# Patient Record
Sex: FEMALE | Race: WHITE | Employment: UNEMPLOYED | URBAN - METROPOLITAN AREA
[De-identification: names, ages, dates, MRNs, and addresses within clinical notes are randomized per-mention and may not be internally consistent; named-entity substitution may affect disease eponyms.]

---

## 2018-01-01 ENCOUNTER — APPOINTMENT (OUTPATIENT)
Dept: GENERAL RADIOLOGY | Facility: HOSPITAL | Age: 83
DRG: 177 | End: 2018-01-01
Attending: FAMILY MEDICINE
Payer: MEDICARE

## 2018-01-01 ENCOUNTER — APPOINTMENT (OUTPATIENT)
Dept: GENERAL RADIOLOGY | Facility: HOSPITAL | Age: 83
DRG: 177 | End: 2018-01-01
Attending: INTERNAL MEDICINE
Payer: MEDICARE

## 2018-01-01 ENCOUNTER — HOSPITAL ENCOUNTER (INPATIENT)
Facility: HOSPITAL | Age: 83
LOS: 6 days | Discharge: NURSING, REHAB OR LONG-TERM CARE | DRG: 177 | End: 2018-01-01
Attending: EMERGENCY MEDICINE | Admitting: FAMILY MEDICINE
Payer: MEDICARE

## 2018-01-01 ENCOUNTER — APPOINTMENT (OUTPATIENT)
Dept: CV DIAGNOSTICS | Facility: HOSPITAL | Age: 83
DRG: 177 | End: 2018-01-01
Attending: INTERNAL MEDICINE
Payer: MEDICARE

## 2018-01-01 ENCOUNTER — HOSPITAL ENCOUNTER (EMERGENCY)
Facility: HOSPITAL | Age: 83
Discharge: HOME | End: 2018-01-01
Attending: EMERGENCY MEDICINE
Payer: MEDICARE

## 2018-01-01 ENCOUNTER — APPOINTMENT (OUTPATIENT)
Dept: GENERAL RADIOLOGY | Facility: HOSPITAL | Age: 83
DRG: 177 | End: 2018-01-01
Attending: EMERGENCY MEDICINE
Payer: MEDICARE

## 2018-01-01 VITALS
RESPIRATION RATE: 20 BRPM | WEIGHT: 100 LBS | BODY MASS INDEX: 17.07 KG/M2 | DIASTOLIC BLOOD PRESSURE: 70 MMHG | HEART RATE: 104 BPM | HEIGHT: 64 IN | OXYGEN SATURATION: 93 % | TEMPERATURE: 98 F | SYSTOLIC BLOOD PRESSURE: 115 MMHG

## 2018-01-01 VITALS
OXYGEN SATURATION: 94 % | SYSTOLIC BLOOD PRESSURE: 107 MMHG | TEMPERATURE: 97 F | WEIGHT: 100.63 LBS | HEART RATE: 73 BPM | HEIGHT: 58 IN | RESPIRATION RATE: 17 BRPM | DIASTOLIC BLOOD PRESSURE: 55 MMHG | BODY MASS INDEX: 21.12 KG/M2

## 2018-01-01 DIAGNOSIS — I50.9 CONGESTIVE HEART FAILURE, UNSPECIFIED HF CHRONICITY, UNSPECIFIED HEART FAILURE TYPE (HCC): Primary | ICD-10-CM
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
## 2018-01-01 DIAGNOSIS — J69.0 ASPIRATION PNEUMONIA (HCC): ICD-10-CM

## 2018-01-01 DIAGNOSIS — J18.9 NOSOCOMIAL PNEUMONIA: ICD-10-CM

## 2018-01-01 DIAGNOSIS — E86.0 DEHYDRATION: ICD-10-CM

## 2018-01-01 DIAGNOSIS — D64.9 ANEMIA, UNSPECIFIED TYPE: Primary | ICD-10-CM

## 2018-01-01 PROCEDURE — 97530 THERAPEUTIC ACTIVITIES: CPT

## 2018-01-01 PROCEDURE — A4216 STERILE WATER/SALINE, 10 ML: HCPCS

## 2018-01-01 PROCEDURE — 80048 BASIC METABOLIC PNL TOTAL CA: CPT

## 2018-01-01 PROCEDURE — 0B938ZZ DRAINAGE OF RIGHT MAIN BRONCHUS, VIA NATURAL OR ARTIFICIAL OPENING ENDOSCOPIC: ICD-10-PCS | Performed by: INTERNAL MEDICINE

## 2018-01-01 PROCEDURE — 92610 EVALUATE SWALLOWING FUNCTION: CPT

## 2018-01-01 PROCEDURE — 97162 PT EVAL MOD COMPLEX 30 MIN: CPT

## 2018-01-01 PROCEDURE — 85025 COMPLETE CBC W/AUTO DIFF WBC: CPT | Performed by: FAMILY MEDICINE

## 2018-01-01 PROCEDURE — 71045 X-RAY EXAM CHEST 1 VIEW: CPT | Performed by: INTERNAL MEDICINE

## 2018-01-01 PROCEDURE — 96361 HYDRATE IV INFUSION ADD-ON: CPT

## 2018-01-01 PROCEDURE — 87070 CULTURE OTHR SPECIMN AEROBIC: CPT | Performed by: INTERNAL MEDICINE

## 2018-01-01 PROCEDURE — 82607 VITAMIN B-12: CPT | Performed by: FAMILY MEDICINE

## 2018-01-01 PROCEDURE — 85730 THROMBOPLASTIN TIME PARTIAL: CPT | Performed by: INTERNAL MEDICINE

## 2018-01-01 PROCEDURE — 96365 THER/PROPH/DIAG IV INF INIT: CPT

## 2018-01-01 PROCEDURE — 87206 SMEAR FLUORESCENT/ACID STAI: CPT | Performed by: INTERNAL MEDICINE

## 2018-01-01 PROCEDURE — 83880 ASSAY OF NATRIURETIC PEPTIDE: CPT | Performed by: EMERGENCY MEDICINE

## 2018-01-01 PROCEDURE — 85025 COMPLETE CBC W/AUTO DIFF WBC: CPT | Performed by: EMERGENCY MEDICINE

## 2018-01-01 PROCEDURE — 82306 VITAMIN D 25 HYDROXY: CPT | Performed by: FAMILY MEDICINE

## 2018-01-01 PROCEDURE — 99152 MOD SED SAME PHYS/QHP 5/>YRS: CPT | Performed by: INTERNAL MEDICINE

## 2018-01-01 PROCEDURE — 80048 BASIC METABOLIC PNL TOTAL CA: CPT | Performed by: EMERGENCY MEDICINE

## 2018-01-01 PROCEDURE — 85027 COMPLETE CBC AUTOMATED: CPT | Performed by: EMERGENCY MEDICINE

## 2018-01-01 PROCEDURE — 85007 BL SMEAR W/DIFF WBC COUNT: CPT | Performed by: EMERGENCY MEDICINE

## 2018-01-01 PROCEDURE — 0B978ZZ DRAINAGE OF LEFT MAIN BRONCHUS, VIA NATURAL OR ARTIFICIAL OPENING ENDOSCOPIC: ICD-10-PCS | Performed by: INTERNAL MEDICINE

## 2018-01-01 PROCEDURE — 92526 ORAL FUNCTION THERAPY: CPT

## 2018-01-01 PROCEDURE — 85025 COMPLETE CBC W/AUTO DIFF WBC: CPT

## 2018-01-01 PROCEDURE — 87106 FUNGI IDENTIFICATION YEAST: CPT | Performed by: INTERNAL MEDICINE

## 2018-01-01 PROCEDURE — 97166 OT EVAL MOD COMPLEX 45 MIN: CPT

## 2018-01-01 PROCEDURE — 87102 FUNGUS ISOLATION CULTURE: CPT | Performed by: INTERNAL MEDICINE

## 2018-01-01 PROCEDURE — 99285 EMERGENCY DEPT VISIT HI MDM: CPT

## 2018-01-01 PROCEDURE — 92611 MOTION FLUOROSCOPY/SWALLOW: CPT

## 2018-01-01 PROCEDURE — 87086 URINE CULTURE/COLONY COUNT: CPT | Performed by: EMERGENCY MEDICINE

## 2018-01-01 PROCEDURE — 80048 BASIC METABOLIC PNL TOTAL CA: CPT | Performed by: FAMILY MEDICINE

## 2018-01-01 PROCEDURE — 87186 SC STD MICRODIL/AGAR DIL: CPT | Performed by: EMERGENCY MEDICINE

## 2018-01-01 PROCEDURE — 71045 X-RAY EXAM CHEST 1 VIEW: CPT | Performed by: FAMILY MEDICINE

## 2018-01-01 PROCEDURE — 71046 X-RAY EXAM CHEST 2 VIEWS: CPT | Performed by: EMERGENCY MEDICINE

## 2018-01-01 PROCEDURE — 93306 TTE W/DOPPLER COMPLETE: CPT | Performed by: INTERNAL MEDICINE

## 2018-01-01 PROCEDURE — 83036 HEMOGLOBIN GLYCOSYLATED A1C: CPT | Performed by: FAMILY MEDICINE

## 2018-01-01 PROCEDURE — 84443 ASSAY THYROID STIM HORMONE: CPT | Performed by: FAMILY MEDICINE

## 2018-01-01 PROCEDURE — 80053 COMPREHEN METABOLIC PANEL: CPT | Performed by: FAMILY MEDICINE

## 2018-01-01 PROCEDURE — 93005 ELECTROCARDIOGRAM TRACING: CPT

## 2018-01-01 PROCEDURE — 88112 CYTOPATH CELL ENHANCE TECH: CPT | Performed by: INTERNAL MEDICINE

## 2018-01-01 PROCEDURE — 87205 SMEAR GRAM STAIN: CPT | Performed by: INTERNAL MEDICINE

## 2018-01-01 PROCEDURE — 74230 X-RAY XM SWLNG FUNCJ C+: CPT | Performed by: FAMILY MEDICINE

## 2018-01-01 PROCEDURE — 93010 ELECTROCARDIOGRAM REPORT: CPT | Performed by: EMERGENCY MEDICINE

## 2018-01-01 PROCEDURE — 80061 LIPID PANEL: CPT | Performed by: FAMILY MEDICINE

## 2018-01-01 PROCEDURE — 96375 TX/PRO/DX INJ NEW DRUG ADDON: CPT

## 2018-01-01 PROCEDURE — 87088 URINE BACTERIA CULTURE: CPT | Performed by: EMERGENCY MEDICINE

## 2018-01-01 PROCEDURE — 87086 URINE CULTURE/COLONY COUNT: CPT | Performed by: FAMILY MEDICINE

## 2018-01-01 PROCEDURE — 87116 MYCOBACTERIA CULTURE: CPT | Performed by: INTERNAL MEDICINE

## 2018-01-01 PROCEDURE — 87077 CULTURE AEROBIC IDENTIFY: CPT | Performed by: INTERNAL MEDICINE

## 2018-01-01 PROCEDURE — 84132 ASSAY OF SERUM POTASSIUM: CPT | Performed by: FAMILY MEDICINE

## 2018-01-01 PROCEDURE — 85610 PROTHROMBIN TIME: CPT | Performed by: INTERNAL MEDICINE

## 2018-01-01 RX ORDER — FUROSEMIDE 20 MG/1
20 TABLET ORAL DAILY
Qty: 30 TABLET | Refills: 0 | Status: ON HOLD | OUTPATIENT
Start: 2018-01-01 | End: 2019-01-01

## 2018-01-01 RX ORDER — FLUTICASONE PROPIONATE 50 MCG
1 SPRAY, SUSPENSION (ML) NASAL DAILY
Status: DISCONTINUED | OUTPATIENT
Start: 2018-01-01 | End: 2018-01-01

## 2018-01-01 RX ORDER — HYDROCHLOROTHIAZIDE 25 MG/1
12.5 TABLET ORAL DAILY
Status: ON HOLD | COMMUNITY
End: 2019-01-01

## 2018-01-01 RX ORDER — ASPIRIN 81 MG
TABLET, DELAYED RELEASE (ENTERIC COATED) ORAL
COMMUNITY

## 2018-01-01 RX ORDER — 0.9 % SODIUM CHLORIDE 0.9 %
VIAL (ML) INJECTION
Status: DISPENSED
Start: 2018-01-01 | End: 2018-01-01

## 2018-01-01 RX ORDER — POTASSIUM CHLORIDE 1.5 G/1.77G
20 POWDER, FOR SOLUTION ORAL DAILY
Status: DISCONTINUED | OUTPATIENT
Start: 2018-01-01 | End: 2018-01-01

## 2018-01-01 RX ORDER — FUROSEMIDE 40 MG/1
40 TABLET ORAL
Status: DISCONTINUED | OUTPATIENT
Start: 2018-01-01 | End: 2018-01-01

## 2018-01-01 RX ORDER — MEGESTROL ACETATE 40 MG/ML
SUSPENSION ORAL DAILY
COMMUNITY

## 2018-01-01 RX ORDER — METHYLPREDNISOLONE SODIUM SUCCINATE 40 MG/ML
40 INJECTION, POWDER, LYOPHILIZED, FOR SOLUTION INTRAMUSCULAR; INTRAVENOUS DAILY
Status: DISCONTINUED | OUTPATIENT
Start: 2018-01-01 | End: 2018-01-01

## 2018-01-01 RX ORDER — ZINC SULFATE 50(220)MG
220 CAPSULE ORAL DAILY
Status: DISCONTINUED | OUTPATIENT
Start: 2018-01-01 | End: 2018-01-01

## 2018-01-01 RX ORDER — 0.9 % SODIUM CHLORIDE 0.9 %
VIAL (ML) INJECTION
Status: COMPLETED
Start: 2018-01-01 | End: 2018-01-01

## 2018-01-01 RX ORDER — SODIUM CHLORIDE 9 MG/ML
INJECTION, SOLUTION INTRAVENOUS
Status: COMPLETED
Start: 2018-01-01 | End: 2018-01-01

## 2018-01-01 RX ORDER — SODIUM CHLORIDE 9 MG/ML
INJECTION, SOLUTION INTRAVENOUS ONCE
Status: COMPLETED | OUTPATIENT
Start: 2018-01-01 | End: 2018-01-01

## 2018-01-01 RX ORDER — MELATONIN
325
Status: DISCONTINUED | OUTPATIENT
Start: 2018-01-01 | End: 2018-01-01

## 2018-01-01 RX ORDER — NYSTATIN 100000 U/G
OINTMENT TOPICAL 2 TIMES DAILY
Status: DISCONTINUED | OUTPATIENT
Start: 2018-01-01 | End: 2018-01-01

## 2018-01-01 RX ORDER — ALBUTEROL SULFATE 2.5 MG/3ML
2.5 SOLUTION RESPIRATORY (INHALATION) EVERY 4 HOURS PRN
COMMUNITY

## 2018-01-01 RX ORDER — FLUTICASONE PROPIONATE AND SALMETEROL 250; 50 UG/1; UG/1
1 POWDER RESPIRATORY (INHALATION) EVERY 12 HOURS SCHEDULED
COMMUNITY

## 2018-01-01 RX ORDER — LOVASTATIN 20 MG/1
20 TABLET ORAL NIGHTLY
COMMUNITY

## 2018-01-01 RX ORDER — PRAVASTATIN SODIUM 20 MG
20 TABLET ORAL NIGHTLY
Status: DISCONTINUED | OUTPATIENT
Start: 2018-01-01 | End: 2018-01-01

## 2018-01-01 RX ORDER — MEGESTROL ACETATE 40 MG/ML
800 SUSPENSION ORAL DAILY
Status: DISCONTINUED | OUTPATIENT
Start: 2018-01-01 | End: 2018-01-01

## 2018-01-01 RX ORDER — PANTOPRAZOLE SODIUM 40 MG/1
40 TABLET, DELAYED RELEASE ORAL
Status: DISCONTINUED | OUTPATIENT
Start: 2018-01-01 | End: 2018-01-01

## 2018-01-01 RX ORDER — HYDROCHLOROTHIAZIDE 25 MG/1
25 TABLET ORAL DAILY
Status: DISCONTINUED | OUTPATIENT
Start: 2018-01-01 | End: 2018-01-01

## 2018-01-01 RX ORDER — MEGESTROL ACETATE 40 MG/ML
40 SUSPENSION ORAL DAILY
Status: DISCONTINUED | OUTPATIENT
Start: 2018-01-01 | End: 2018-01-01

## 2018-01-01 RX ORDER — FUROSEMIDE 10 MG/ML
40 INJECTION INTRAMUSCULAR; INTRAVENOUS ONCE
Status: COMPLETED | OUTPATIENT
Start: 2018-01-01 | End: 2018-01-01

## 2018-01-01 RX ORDER — FUROSEMIDE 20 MG/1
20 TABLET ORAL DAILY
Status: DISCONTINUED | OUTPATIENT
Start: 2018-01-01 | End: 2018-01-01

## 2018-01-01 RX ORDER — AMOXICILLIN AND CLAVULANATE POTASSIUM 500; 125 MG/1; MG/1
1 TABLET, FILM COATED ORAL 2 TIMES DAILY WITH MEALS
Qty: 6 TABLET | Refills: 0 | Status: SHIPPED | OUTPATIENT
Start: 2018-01-01 | End: 2018-01-01

## 2018-01-01 RX ORDER — POTASSIUM CHLORIDE 20 MEQ/1
20 TABLET, EXTENDED RELEASE ORAL DAILY
Status: DISCONTINUED | OUTPATIENT
Start: 2018-01-01 | End: 2018-01-01

## 2018-01-01 RX ORDER — DONEPEZIL HYDROCHLORIDE 10 MG/1
10 TABLET, FILM COATED ORAL NIGHTLY
Status: DISCONTINUED | OUTPATIENT
Start: 2018-01-01 | End: 2018-01-01

## 2018-01-01 RX ORDER — FELODIPINE 5 MG/1
5 TABLET, EXTENDED RELEASE ORAL 2 TIMES DAILY
Status: ON HOLD | COMMUNITY
End: 2018-01-01

## 2018-01-01 RX ORDER — MULTIPLE VITAMINS W/ MINERALS TAB 9MG-400MCG
1 TAB ORAL DAILY
Status: DISCONTINUED | OUTPATIENT
Start: 2018-01-01 | End: 2018-01-01

## 2018-01-01 RX ORDER — AMLODIPINE BESYLATE 5 MG/1
5 TABLET ORAL DAILY
Status: DISCONTINUED | OUTPATIENT
Start: 2018-01-01 | End: 2018-01-01

## 2018-01-01 RX ORDER — CEPHALEXIN 250 MG/1
250 CAPSULE ORAL 2 TIMES DAILY
Qty: 14 CAPSULE | Refills: 0 | Status: SHIPPED | OUTPATIENT
Start: 2018-01-01 | End: 2018-01-01

## 2018-01-01 RX ORDER — POTASSIUM CHLORIDE 20 MEQ/1
40 TABLET, EXTENDED RELEASE ORAL EVERY 4 HOURS
Status: COMPLETED | OUTPATIENT
Start: 2018-01-01 | End: 2018-01-01

## 2018-01-01 RX ORDER — OMEPRAZOLE 40 MG/1
40 CAPSULE, DELAYED RELEASE ORAL DAILY
COMMUNITY

## 2018-01-01 RX ORDER — FLUTICASONE PROPIONATE 50 MCG
SPRAY, SUSPENSION (ML) NASAL DAILY
COMMUNITY

## 2018-01-01 RX ORDER — FUROSEMIDE 40 MG/1
40 TABLET ORAL DAILY
Status: DISCONTINUED | OUTPATIENT
Start: 2018-01-01 | End: 2018-01-01

## 2018-01-01 RX ORDER — MELATONIN
325
COMMUNITY

## 2018-01-01 RX ORDER — DONEPEZIL HYDROCHLORIDE 10 MG/1
10 TABLET, FILM COATED ORAL NIGHTLY
COMMUNITY

## 2018-01-01 RX ORDER — ALBUTEROL SULFATE 2.5 MG/3ML
2.5 SOLUTION RESPIRATORY (INHALATION) EVERY 4 HOURS PRN
Status: DISCONTINUED | OUTPATIENT
Start: 2018-01-01 | End: 2018-01-01

## 2018-01-01 RX ORDER — POTASSIUM CHLORIDE 1.5 G/1.77G
20 POWDER, FOR SOLUTION ORAL DAILY
COMMUNITY

## 2018-01-01 RX ORDER — NYSTATIN 100000 U/G
1 OINTMENT TOPICAL 2 TIMES DAILY
Qty: 1 TUBE | Refills: 0 | Status: SHIPPED | OUTPATIENT
Start: 2018-01-01

## 2018-01-01 RX ORDER — SODIUM CHLORIDE 9 MG/ML
INJECTION, SOLUTION INTRAVENOUS CONTINUOUS
Status: DISCONTINUED | OUTPATIENT
Start: 2018-01-01 | End: 2018-01-01

## 2018-01-01 RX ORDER — FUROSEMIDE 10 MG/ML
20 INJECTION INTRAMUSCULAR; INTRAVENOUS
Status: DISCONTINUED | OUTPATIENT
Start: 2018-01-01 | End: 2018-01-01

## 2018-01-01 RX ORDER — CLONAZEPAM 0.5 MG/1
0.25 TABLET ORAL 2 TIMES DAILY PRN
Status: DISCONTINUED | OUTPATIENT
Start: 2018-01-01 | End: 2018-01-01

## 2018-01-01 RX ORDER — HEPARIN SODIUM 5000 [USP'U]/ML
5000 INJECTION, SOLUTION INTRAVENOUS; SUBCUTANEOUS EVERY 12 HOURS SCHEDULED
Status: DISCONTINUED | OUTPATIENT
Start: 2018-01-01 | End: 2018-01-01

## 2018-01-01 RX ORDER — AMLODIPINE BESYLATE 5 MG/1
5 TABLET ORAL DAILY
Qty: 30 TABLET | Refills: 0 | Status: SHIPPED | OUTPATIENT
Start: 2018-01-01

## 2018-01-01 RX ORDER — DIAZEPAM 5 MG/ML
INJECTION, SOLUTION INTRAMUSCULAR; INTRAVENOUS
Status: DISCONTINUED | OUTPATIENT
Start: 2018-01-01 | End: 2018-01-01 | Stop reason: HOSPADM

## 2018-01-01 RX ORDER — POTASSIUM CHLORIDE 14.9 MG/ML
20 INJECTION INTRAVENOUS ONCE
Status: DISCONTINUED | OUTPATIENT
Start: 2018-01-01 | End: 2018-01-01

## 2018-11-11 NOTE — ED NOTES
Contacted Rosalind CONTRERAS at [facility] RN made aware that Dr. Latoya Brito did talk to pts PMD, Brionna Fowler made aware that pt will be given with IV fluids and IV abx then pt will be sent home, Rosalind CONTRERAS voices understanding

## 2018-11-11 NOTE — ED PROVIDER NOTES
Patient Seen in: [facility] Emergency Department    History   Patient presents with:  Abnormal Result (metabolic, cardiac): low hgb    Stated Complaint: Low hgb/cdiff on abx     HPI    81 yo F with MPH dementia, COPD, CAD, HTN, HL presenting via EMS Value    Glucose 118 (*)     Chloride 112 (*)     CO2 20 (*)     BUN 57 (*)     Calcium, Total 8.3 (*)     BUN/CREA Ratio 42.9 (*)     Calculated Osmolality 311 (*)     GFR, Non- 34 (*)     GFR, -American 39 (*)     All other compone Prescribed:  Current Discharge Medication List    START taking these medications    cephALEXin 250 MG Oral Cap  Take 1 capsule (250 mg total) by mouth 2 (two) times daily for 7 days.   Qty: 14 capsule Refills: 0

## 2018-11-11 NOTE — ED NOTES
[facility] ambulance is here to take pt back to nursing home, pt remains stable, prescription and discharge instruction sent with pt

## 2018-11-11 NOTE — ED INITIAL ASSESSMENT (HPI)
Paty arrive in ER from [facility] via [employer] with c/o low hgb of 8.6 that was taken today, as per ems pt was dx with cdiff and was started with vanco q6 x 10 days, pt is aox1 as baseline per ems

## 2018-12-05 PROBLEM — J18.9 NOSOCOMIAL PNEUMONIA: Status: ACTIVE | Noted: 2018-01-01

## 2018-12-05 PROBLEM — I50.9 CONGESTIVE HEART FAILURE (HCC): Status: ACTIVE | Noted: 2018-01-01
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
## 2018-12-05 PROBLEM — I50.9 CONGESTIVE HEART FAILURE, UNSPECIFIED HF CHRONICITY, UNSPECIFIED HEART FAILURE TYPE (HCC): Status: ACTIVE | Noted: 2018-01-01

## 2018-12-06 NOTE — DIETARY NOTE
ADULT NUTRITION INITIAL ASSESSMENT    Pt is at moderate nutrition risk. Pt meets malnutrition criteria.       CRITERIA FOR MALNUTRITION DIAGNOSIS:  Criteria for non-severe malnutrition diagnosis: chronic illness related to body fat mild depletion and muscl lb)  11/10/18 : 45.4 kg (100 lb)    GASTROINTESTINAL: dysphagia    FOOD/NUTRITION RELATED HISTORY:  Appetite: Fair  Intake: 60-70 %    Intake Meeting Needs: Marginal, added supplements    Food Allergies: No  Cultural/Ethnic/Jain Preferences: Angelica Dumont and Nutrient Intake:      Monitor: adequacy of PO intake and tolerance of supplement intake.     - Food and Nutrient Administration:      Monitor: need for temporary enteral nutrition    - Anthropometric Measurement:      Monitor: wt and wt change     - Nut

## 2018-12-06 NOTE — PROGRESS NOTES
[facility] Pharmacy Note:  Renal Adjustment for piperacillin/tazobactam (Gene Rank)    Tania Rossi is a 80year old female who has been prescribed piperacillin/tazobactam (ZOSYN) 3.375g  every 8 hrs.   CrCl is estimated creatinine clearance is 19.4 mL/min (based on

## 2018-12-06 NOTE — PLAN OF CARE
RESPIRATORY - ADULT    • Achieves optimal ventilation and oxygenation Progressing            Admitted w/ aspiration pneumonia. Bedside swallow by speech indicates need for nectar thick liquids and mechanical soft diet. Video swallow was ordered.   Plan is

## 2018-12-06 NOTE — CONSULTS
[facility]    PATIENT'S NAME: JOHNSON GRIER   ATTENDING PHYSICIAN: Yasir Thomson MD   CONSULTING PHYSICIAN: Kelly Mccray.  Meeta Fatima MD   PATIENT ACCOUNT#:   [id number]    LOCATION:  [address] RECORD #:   [id number]       DATE OF BIRTH:  [birth year] rales, rhonchi, wheezing. HEART:  Regular. No murmur. Cardiac dullness was normal.  ABDOMEN:  Soft. No hepatosplenomegaly. No ascites. No hernia. EXTREMITIES:  No clubbing of fingers or lower leg edema.      LABORATORY DATA:  Electrolytes showed sodi

## 2018-12-06 NOTE — PLAN OF CARE
Problem: Patient/Family Goals  Goal: Patient/Family Long Term Goal  Patient's Long Term Goal:     Interventions:  - breath better  - See additional Care Plan goals for specific interventions   Outcome: Progressing

## 2018-12-06 NOTE — CONSULTS
Patient is a 80year old female who was admitted to the hospital for Congestive heart failure, unspecified HF chronicity, unspecified heart failure type ([facility]): This is an 51-year-old female admitted with congestive heart failure diagnosis.   Patient has ad packet 20 mEq 20 mEq Oral Daily   Pravastatin Sodium (PRAVACHOL) tab 20 mg 20 mg Oral Nightly   zinc sulfate (ZINCATE) cap 220 mg 220 mg Oral Daily   hydrochlorothiazide (HYDRODIURIL) tab 25 mg 25 mg Oral Daily   AmLODIPine Besylate (NORVASC) tab 5 mg 5 mg 106 lb (48.1 kg), SpO2 99 %. Intake/Output:   Last 3 shifts: [id number]@   This shift: No intake/output data recorded.      Vent Settings:      Hemodynamic parameters (last 24 hours):      Scheduled Meds:   • ferrous sulfate  325 mg Oral Daily with arvind ([facility]  Systolic vs diastolic      Nosocomial pneumonia  Possibly superimposed      CKD3      Plan: We will start with Lasix 20 twice daily. 2–echocardiogram will be performed. LV function and diastolic parameters will be assessed.   Further recommendation

## 2018-12-06 NOTE — ED PROVIDER NOTES
Patient Seen in: [facility] AND [facility] Emergency Department    History   Patient presents with:  Dyspnea JUAN SOB (respiratory)    Stated Complaint:     HPI    Patient is a 42-year-old female who presents to the emergency department from [address] [address] field and the left lower field. Musculoskeletal: Normal range of motion. Neurological: She is alert. She has normal strength. She is disoriented. No cranial nerve deficit or sensory deficit. She displays a negative Romberg sign.    Skin: Skin is warm an pneumonia    Disposition:  Admit  12/5/2018 11:09 pm    Follow-up:  No follow-up provider specified.   We recommend that you schedule follow up care with a primary care provider within the next three months to obtain basic health screening including reasses

## 2018-12-06 NOTE — PROGRESS NOTES
[facility] Dosing Service  Antibiotic Dosing    Rhona Mahajan is a 80year old female for whom pharmacy is dosing Zosyn for treatment of  pneumonia. .  Other antibiotics (Not dosed by pharmacy): Allergies: has No Known Allergies.     Vitals: /76

## 2018-12-06 NOTE — CM/SW NOTE
SW self-referred to meet with patient due to case findings and diagnosis. Pt is from [facility] [phone] . CHRISTIANO sent updates via [employer]alexandr. Social work/case management to remain available for support and/or discharge planning.

## 2018-12-06 NOTE — CONSULTS
INFECTIOUS DISEASE CONSULT NOTE    Lucy Bridges Patient Status:  Inpatient    1923 MRN [id number]   Location [facility] 3W/SW Attending Jer David MD   Hosp Day # 1 PCP SAM LAM solution 2.5 mg, 2.5 mg, Nebulization, Q4H PRN  •  ClonazePAM (KLONOPIN) tab 0.25 mg, 0.25 mg, Oral, BID PRN  •  ferrous sulfate EC tab 325 mg, 325 mg, Oral, Daily with breakfast  •  Fluticasone Furoate-Vilanterol (BREO ELLIPTA) 100-25 MCG/INH inhaler 1 pu lymphadenopathy. Supple. kyphosis  Respiratory: ronchi heard midway up lung fields b/l, no wheezing appreciated  Cardiovascular: irregular, III/VI systolic murmur heard best at RUSB  Abdomen: Soft, nontender, nondistended.    : no suprapubic tenderness, dementia: unclear pt's baseline    PLAN:  - Continue zosyn at this time  - Pulmonary consulted, appreciate recs. If bronchoscopy done, please obtain aerobic/anaerobic clx  - Consider swallow evaluation  - Cardiology consulted, appreciate recs.   Fluid hortencia

## 2018-12-06 NOTE — SLP NOTE
ADULT SWALLOWING EVALUATION    ASSESSMENT    ASSESSMENT/OVERALL IMPRESSION:    Pt assessed sitting upright in bed on 3L/min 02 via NC. Pt unable to participate in OME secondary to the known dementia; perseverated on \"getting up\" during session- RN aware. time;Crushed in puree  Treatment Plan/Recommendations: Flexible endoscopic evaluation of swallowing  Discharge Recommendations/Plan: Undetermined    HISTORY   MEDICAL HISTORY  Reason for Referral: R/O aspiration    Problem List  Principal Problem:    Conge Appears impaired  Laryngeal Elevation Strength: Appears impaired     (Please note: Silent aspiration cannot be evaluated clinically.  Videofluoroscopic Swallow Study is required to rule-out silent aspiration.)       GOALS  Goal #1 The patient will tolerate

## 2018-12-06 NOTE — H&P
[facility]    PATIENT'S NAME: Valentino Aparna   ATTENDING PHYSICIAN: Patrick Gonzales MD   PATIENT ACCOUNT#:   [id number]    LOCATION:  [address] [place] [address] [address] RECORD #:   [id number]       YOB: 1923  ADMISSION DATE:       12/05/201 wheelchair bound, able to transfer with assist at the nursing home. She is DNR. She is on a general diet. PHYSICAL EXAMINATION:    GENERAL:  A 80-year-old white female. Alert, in no apparent distress.   VITAL SIGNS:  Blood pressure 124/55, pulse 92

## 2018-12-07 PROBLEM — J69.0 ASPIRATION PNEUMONIA (HCC): Status: ACTIVE | Noted: 2018-01-01

## 2018-12-07 NOTE — BRIEF OP NOTE
[facility] ENDOSCOPY LAB SUITES  Brief Op Note     Geri Armenta Patient Status:  Inpatient    1923 MRN [id number]   Location [facility] ENDOSCOPY LAB SUITES Attending Salina Manriquez MD   Hosp Day # 2 PCP Josefa Clay MD     Pro

## 2018-12-07 NOTE — PROGRESS NOTES
[facility]    Progress Note    Tejal Douglas Patient Status:  Inpatient    1923 MRN [id number]   Location [facility] 3W/SW Attending Michela Rouse MD   Hosp Day # 2 PCP Richar Cope MD       Subjective:   Tejal Douglas 2.58 12/07/2018    B12 534 12/07/2018       No procedure found. Xr Chest Pa + Lat Chest (cpt=71046)    Result Date: 12/5/2018  CONCLUSION:   Moderate pulmonary edema. Bilateral lower lobe air space opacities suggestive of atelectasis.  Underlying pneumo

## 2018-12-07 NOTE — OPERATIVE REPORT
[facility]    PATIENT'S NAME: JOHNSON GRIER   ATTENDING PHYSICIAN: Socorro Corbin MD   OPERATING PHYSICIAN: Donaldo Whiteside.  Lay Reece MD   PATIENT ACCOUNT#:   [id number]    LOCATION:  [address] [place] [address] Road #:   [id number]       DATE OF BIRTH:  [birth year]

## 2018-12-07 NOTE — SLP NOTE
ADULT VIDEOFLUOROSCOPIC SWALLOWING STUDY    Admission Date: 12/5/2018  Evaluation Date: 12/07/18  Radiologist: Dr. Reyes Soda: Mechanical soft ground  Diet Recommendations - Liquid: Nectar thick(VIA TSP ON Upright;Midline. Patient Viewed: Lateral.  Patient Alertness: Fully alert. Consistencies Presented: Thin liquids; Nectar thick liquids;Puree; Soft solid to assess oropharyngeal swallow function and assess for compensatory strategies to improve safe swallow Impaired  Triggered at: Base of tongue  Laryngeal Penetration: None  Tracheal Aspiration: None     Penetration Aspiration Scale Score: Score 3: Material enters the airway, remains above the vocal folds, and is not ejected from the airway       Overall Impr and recommendations with patient/family/caregiver. Agreement/Understanding verbalized and all questions answered to their apparent satisfaction. INTERDISCIPLINARY COMMUNICATION  Reviewed results with Radiologist; agreement verbalized.     Patient Experi

## 2018-12-07 NOTE — PROGRESS NOTES
Cardiology progress note    24 h VS stable         Current Medications:    Current Facility-Administered Medications:  Sodium Chloride 0.9 % solution      influenza virus vaccine (FLUAD) ages 72 years and older inj 0.5ml 0.5 mL Intramuscular Prior to disch ferrous sulfate 325 (65 FE) MG Oral Tab EC Take 325 mg by mouth daily with breakfast.   Megestrol Acetate 40 MG/ML Oral Suspension Take by mouth daily. Multiple Vitamins-Minerals (THERA-M) Oral Tab Take by mouth.    Donepezil HCl 10 MG Oral Tab Take 10 mg Oral Daily   • AmLODIPine Besylate  5 mg Oral Daily   • furosemide  20 mg Intravenous BID (Diuretic)   • Megestrol Acetate  40 mg Oral Daily   • Heparin Sodium (Porcine)  5,000 Units Subcutaneous 2 times per day   • MethylPREDNISolone Sodium Succ  40 mg moderately calcified leaflets. There was     mild to moderate stenosis. Peak velocity (S): 2.97m/sec. Mean     gradient (S): 19mm Hg. Peak gradient (S): 35mm Hg. Valve area     (VTI): 0.8cm^2. Valve area (Vmax): 0.77cm^2. 3. Mitral valve:  Moderately calci

## 2018-12-07 NOTE — PROGRESS NOTES
[facility]    Progress Note    Redia Brain Patient Status:  Inpatient    1923 MRN [id number]   Location [facility] ENDOSCOPY LAB SUITES Attending Walter Bacon MD   Hosp Day # 2 PCP Nuzhat Jessica MD       Subjective: to ensure resolution.   Dictated by (CST): Mary Nye MD on 12/05/2018 at 20:45     Approved by (CST): Mary Nye MD on 12/05/2018 at 20:46          Ekg 12-lead    Result Date: 12/5/2018  ECG Report  Interpretation  -------------------------- Sinus Rhyt

## 2018-12-08 PROBLEM — E46 MALNUTRITION (HCC): Status: ACTIVE | Noted: 2018-01-01

## 2018-12-08 NOTE — PLAN OF CARE
CARDIOVASCULAR - ADULT    • Maintains optimal cardiac output and hemodynamic stability Completed    • Absence of cardiac arrhythmias or at baseline Completed    monitoring    DISCHARGE PLANNING    • Discharge to home or other facility with appropriate reso

## 2018-12-08 NOTE — PROGRESS NOTES
[facility]    Progress Note    Lane Page Patient Status:  Inpatient    1923 MRN [id number]   Location [facility] 3W/SW Attending Juan Carlos Kraus MD   Hosp Day # 3 PCP Brad Saleh MD       Subjective:   [address]ane Page (cpt=71045)    Result Date: 12/8/2018  CONCLUSION:   Improved but underlying pulmonary edema with left greater than right pleural effusions and basilar airspace disease.   Dictated by (CST): Jane Gross MD on 12/08/2018 at 9:10     Approved by (CST):

## 2018-12-08 NOTE — PLAN OF CARE
Problem: Patient/Family Goals  Goal: Patient/Family Long Term Goal  Patient's Long Term Goal: the breathe better    Interventions:  - follow plan of care  - medications as prescribed  - reposition frequently   - See additional Care Plan goals for specific response  - Implement non-pharmacological measures as appropriate and evaluate response  - Consider cultural and social influences on pain and pain management  - Manage/alleviate anxiety  - Utilize distraction and/or relaxation techniques  - Monitor for op Management Department for coordinating discharge planning if the patient needs post-hospital services based on physician/LIP order or complex needs related to functional status, cognitive ability or social support system   Outcome: Progressing      Problem

## 2018-12-08 NOTE — PROGRESS NOTES
INFECTIOUS DISEASE PROGRESS NOTE    Cindy Beckman Patient Status:  Inpatient    1923 MRN [id number]   Location [facility] 3W/SW Attending Karri Rivera MD   Hosp Day # 3 PCP Pebbles Stewart MD     Subjective:  Awake, on 2L NC.  No diarrhe moderate pulmonary edema, b/l lobe infiltrates suggestive of atelectasis and ALIZA opacity c/w pneumonia. Cardiology consulted, pt given lasix. Pulmonary consulted, started on solumedrol and steroids with plans for bronchoscopy within the next day.   Pt sta

## 2018-12-08 NOTE — PHYSICAL THERAPY NOTE
PHYSICAL THERAPY EVALUATION - INPATIENT     Room Number: 306/306-A  Evaluation Date: 12/8/2018  Type of Evaluation: Initial        Presenting Problem: CHF  Reason for Therapy: Mobility Dysfunction and Discharge Planning    PHYSICAL THERAPY ASSESSMENT failure type [facility])  Active Problems:    Congestive heart failure ([facility]    Nosocomial pneumonia    Aspiration pneumonia (HCC)    Malnutrition (HCC)      Past Medical History  Past Medical History:   Diagnosis Date   • Anxiety    • COPD (chronic obstructive hospital room?: Total   -   Climbing 3-5 steps with a railing?: Total     AM-PAC Score:  Raw Score: 9   PT Approx Degree of Impairment Score: 81.38%   Standardized Score (AM-PAC Scale): 30.55   CMS Modifier (G-Code): CM    FUNCTIONAL ABILITY STATUS  Gait A

## 2018-12-08 NOTE — PROGRESS NOTES
INFECTIOUS DISEASE PROGRESS NOTE    Jose Antonio Elizalde Patient Status:  Inpatient    1923 MRN [id number]   Location [facility] 3W/SW Attending Terry Wells MD   Hosp Day # 2 PCP Roseline Pate MD     Subjective:  Patient s/p bronch earlier Cardiology consulted, pt given lasix. Pulmonary consulted, started on solumedrol and steroids with plans for bronchoscopy within the next day. Pt started on empiric zosyn.   ID consulted for further assistance.      Problem List:  - Pneumonia: imaging wit

## 2018-12-08 NOTE — PROGRESS NOTES
Patient seen in follow up. NAD unable to get history.    12/08/18  0816   BP: 120/64   Pulse: 68   Resp: 15   Temp: 97.9 °F (36.6 °C)       Intake/Output Summary (Last 24 hours) at 12/8/2018 1308  Last data filed at 12/8/2018 1224  Gross per 24 hour furosemide (LASIX) injection 20 mg 20 mg Intravenous BID (Diuretic)   Megestrol Acetate (MEGACE) oral suspension 40 mg 40 mg Oral Daily   Heparin Sodium (Porcine) 5000 UNIT/ML injection 5,000 Units 5,000 Units Subcutaneous 2 times per day   MethylPREDNISol CONCLUSION:   1. Evidence of penetration but no obvious aspiration. 2. A full report from speech pathology to follow. Dictated by (CST): Jose Angel Catalan MD on 12/07/2018 at 16:24     Approved by (CST):  Jose Angel Catalan MD on 12/07/2018 at 16:26

## 2018-12-08 NOTE — SLP NOTE
SPEECH DAILY NOTE - INPATIENT    ASSESSMENT & PLAN   ASSESSMENT    Pt seen upright in bed with current diet of mechanical soft ground/nectar-thick liquids via tsp for monitoring of diet tolerance.  Swallowing precautions/strategies discussed and demonstrate Recommendations  Discharge Recommendations/Plan: Undetermined    Treatment Plan  Treatment Plan/Recommendations: Dysphagia therapy    Interdisciplinary Communication: Discussed with RN  Recommendations posted at bedside            GOALS:      Goal #1 The p

## 2018-12-08 NOTE — PROGRESS NOTES
[facility]    Progress Note    Burlene Crigler Patient Status:  Inpatient    1923 MRN [id number]   Location [facility] 3W/SW Attending Nadeen Leija MD   Hosp Day # 3 PCP Anup Solares MD       Subjective:   Burlene Crigler 29.6 12/06/2018    INR 1.3 (H) 12/06/2018    TSH 2.58 12/07/2018    B12 534 12/07/2018       No procedure found. Xr Video Swallow (cpt=74230)    Result Date: 12/7/2018  CONCLUSION:   1. Evidence of penetration but no obvious aspiration.  2. A full report

## 2018-12-09 NOTE — PROGRESS NOTES
[facility] HOSP - [facility]    Progress Note    Siobhan Hutson Patient Status:  Inpatient    1923 MRN [id number]   Location [facility] 3W/SW Attending Enrique Bishop MD   Hosp Day # 4 PCP Farheen White MD       Subjective:   Siobhan Hutson TP 6.4 12/07/2018    AST 26 12/07/2018    ALT 16 12/07/2018    PTT 29.6 12/06/2018    INR 1.3 (H) 12/06/2018    TSH 2.58 12/07/2018    B12 534 12/07/2018       No procedure found.     Xr Video Swallow (cpt=74230)    Result Date: 12/7/2018  CONCLUSION:

## 2018-12-09 NOTE — PROGRESS NOTES
Patient seen in follow up. NAD unable to get history.    12/09/18  0802   BP: 108/51   Pulse:    Resp: 21   Temp: 97.4 °F (36.3 °C)       Intake/Output Summary (Last 24 hours) at 12/9/2018 1522  Last data filed at 12/9/2018 1338  Gross per 24 hour   I Heparin Sodium (Porcine) 5000 UNIT/ML injection 5,000 Units 5,000 Units Subcutaneous 2 times per day   MethylPREDNISolone Sodium Succ (Solu-MEDROL) injection 40 mg 40 mg Intravenous Daily   Pantoprazole Sodium (PROTONIX) EC tab 40 mg 40 mg Oral QAM AC   Pi Component Value Date    CREATSERUM 1.43 12/09/2018    BUN 58 12/09/2018     12/09/2018    K 2.8 12/09/2018    CL 98 12/09/2018    CO2 31 12/09/2018     12/09/2018    CA 8.4 12/09/2018       IMPRESSION:     Acute diastolic CHF. Severe MR.

## 2018-12-09 NOTE — PROGRESS NOTES
[facility]    Progress Note    Geri Armenta Patient Status:  Inpatient    1923 MRN [id number]   Location [facility] 3W/SW Attending Salina Manriquez MD   Hosp Day # 4 PCP Josefa Clay MD       Subjective:   Geri Armenta Portable  (cpt=71045)    Result Date: 12/8/2018  CONCLUSION:   Improved but underlying pulmonary edema with left greater than right pleural effusions and basilar airspace disease.   Dictated by (CST): Melanie Francois MD on 12/08/2018 at 9:10     Approved

## 2018-12-10 NOTE — PLAN OF CARE
Problem: SAFETY ADULT - FALL  Goal: Free from fall injury  INTERVENTIONS:  - Assess pt frequently for physical needs  - Identify cognitive and physical deficits and behaviors that affect risk of falls.   - [place] fall precautions as indicated by assessme

## 2018-12-10 NOTE — PROGRESS NOTES
[facility]    Progress Note    Burlene Crigler Patient Status:  Inpatient    1923 MRN [id number]   Location [facility] 3W/SW Attending Nadeen Leija MD   Hosp Day # 5 PCP Anup Solares MD       Subjective:   Burlene Crigler ALKPHO 52 12/07/2018    BILT 0.7 12/07/2018    TP 6.4 12/07/2018    AST 26 12/07/2018    ALT 16 12/07/2018    PTT 29.6 12/06/2018    INR 1.3 (H) 12/06/2018    TSH 2.58 12/07/2018    B12 534 12/07/2018       No procedure found.                 Pebbles Stewart

## 2018-12-10 NOTE — PHYSICAL THERAPY NOTE
PHYSICAL THERAPY TREATMENT NOTE - INPATIENT     Room Number: 669/241-E       Presenting Problem: CHF    Problem List  Principal Problem:    Congestive heart failure, unspecified HF chronicity, unspecified heart failure type ([facility]  Active Problems:    Conge Static Sitting: Poor +  Dynamic Sitting: Poor +           Static Standing: Not tested  Dynamic Standing: Not tested    ACTIVITY TOLERANCE                         O2 WALK                  AM-PAC '6-Clicks' INPATIENT

## 2018-12-10 NOTE — PROGRESS NOTES
INFECTIOUS DISEASE PROGRESS NOTE    [facility] Patient Status:  Inpatient    1923 MRN [id number]   Location [facility] 3W/SW Attending Adelia Ribeiro MD   Hosp Day # 5 PCP Benita Kulkarni MD     Subjective:  Awake, breathing on room ai afebrile and otherwise HDS. Currently on 3LNC satting well. Labs with WBC of 9.4k, CKD with CrCl of 19, BNP of 253. Imaging with moderate pulmonary edema, b/l lobe infiltrates suggestive of atelectasis and ALIZA opacity c/w pneumonia.   Cardiology consulte

## 2018-12-10 NOTE — OCCUPATIONAL THERAPY NOTE
OCCUPATIONAL THERAPY EVALUATION - INPATIENT     Room Number: 306/306-A  Evaluation Date: 12/10/2018  Type of Evaluation: Initial  Presenting Problem: chf,pneumonia    Physician Order: IP Consult to Occupational Therapy  Reason for Therapy: ADL/IADL Dysfu Malnutrition ([facility]    Past Medical History  Past Medical History:   Diagnosis Date   • Anxiety    • COPD (chronic obstructive pulmonary disease) ([facility]    • Dementia    • Essential hypertension    • Hyperlipidemia    • Renal disorder      Past Surgical Hist supervision  Dynamic Sitting: na  Static Standing: na  Dynamic Standing: na    FUNCTIONAL ADL ASSESSMENT  Grooming: total assist  Feeding: total assist  Bathing: total assist  Toileting: total assist  Upper Extremity Dressing: total assist  Lower Extremity

## 2018-12-10 NOTE — PROGRESS NOTES
[facility]    Progress Note    Lane Page Patient Status:  Inpatient    1923 MRN [id number]   Location [facility] 3W/SW Attending Juan Carlos Kraus MD   Hosp Day # 5 PCP Brad Saleh MD       Subjective:   Lane Page Assessment and Plan:   Principal Problem:    Congestive heart failure, unspecified HF chronicity, unspecified heart failure type [facility])  Active Problems:    Congestive heart failure ([facility] [facility]    Nosocomial pneumonia    Asp[facility] pneumonia ([facility]    Malnutri

## 2018-12-10 NOTE — PROGRESS NOTES
Patient seen in follow up. NAD unable to get history.    12/10/18  1453   BP: 125/72   Pulse: 93   Resp: 15   Temp: 98.8 °F (37.1 °C)       Intake/Output Summary (Last 24 hours) at 12/10/2018 1454  Last data filed at 12/10/2018 1406  Gross per 24 hour MethylPREDNISolone Sodium Succ (Solu-MEDROL) injection 40 mg 40 mg Intravenous Daily   Pantoprazole Sodium (PROTONIX) EC tab 40 mg 40 mg Oral QAM AC   Piperacillin Sod-Tazobactam So (ZOSYN) 3.375 g in dextrose 5 % 100 mL ADD-vantage 3.375 g Intravenous Q12 BUN 56 12/10/2018     12/10/2018    K 4.0 12/10/2018     12/10/2018    CO2 27 12/10/2018     12/10/2018    CA 8.7 12/10/2018       1 Congestive heart failure, unspecified HF chronicity, diastolic. lasix 20 mg po qd stop hctz.     2  Nos

## 2018-12-10 NOTE — SLP NOTE
SPEECH DAILY NOTE - INPATIENT    ASSESSMENT & PLAN   ASSESSMENT  Pt positioned upright 90 degrees in bed for meal assessment and training of swallowing compensatory strategies. Orange swallow strategies in room.  No family present at the time of the session Discussed with RN  Recommendations posted at bedside            GOALS:      Goal #1 The patient will tolerate mechanical soft ground diet consistency and nectar thickened  liquids without overt signs or symptoms of aspiration with 100 % accuracy over 2 ses

## 2018-12-11 NOTE — PROGRESS NOTES
Patient seen in follow up. NAD unable to get history.    12/11/18  0819   BP: 107/55   Pulse: 73   Resp: 17   Temp: 97.4 °F (36.3 °C)       Intake/Output Summary (Last 24 hours) at 12/11/2018 1002  Last data filed at 12/11/2018 0835  Gross per 24 hour MethylPREDNISolone Sodium Succ (Solu-MEDROL) injection 40 mg 40 mg Intravenous Daily   Pantoprazole Sodium (PROTONIX) EC tab 40 mg 40 mg Oral QAM AC   Piperacillin Sod-Tazobactam So (ZOSYN) 3.375 g in dextrose 5 % 100 mL ADD-vantage 3.375 g Intravenous Q12 CONCLUSION:  1. Chronic blunting of the costophrenic sulci suggesting tiny pleural effusions versus pleural scar. 2.  Right basal pulmonary opacity in left suprahilar pulmonary opacities which could represent atelectasis/scar or pneumonia/infiltrate.  3.

## 2018-12-11 NOTE — PROGRESS NOTES
[facility]    Progress Note    [place] Matt Patient Status:  Inpatient    1923 MRN [id number]   Location [facility] 3W/SW Attending Adelia Ribeiro MD   Hosp Day # 6 PCP Benita Kulkarni MD       Subjective:   Liyah Gutierrez omeprazole 20 mill grams daily, potassium chloride 20 mEq daily, zinc 50 mg daily, Lasix 20 mg every morning, and Augmentin 500 mg twice daily times 3 days. Patient's oxygen was weaned off. Diet is mechanical soft diet with nectar thick liquids.       R

## 2018-12-11 NOTE — PROGRESS NOTES
INFECTIOUS DISEASE PROGRESS NOTE    Marnie Pimentel Patient Status:  Inpatient    1923 MRN [id number]   Location [facility] 3W/SW Attending Becka Muse MD   Hosp Day # 6 PCP Josesito Maldonado MD     Subjective:  Awake, NAD.  Plans for DC to otherwise HDS. Currently on 3LNC satting well. Labs with WBC of 9.4k, CKD with CrCl of 19, BNP of 253. Imaging with moderate pulmonary edema, b/l lobe infiltrates suggestive of atelectasis and ALIZA opacity c/w pneumonia.   Cardiology consulted, pt given l

## 2018-12-11 NOTE — PROGRESS NOTES
[facility]OS[facility] - [facility]    Progress Note    Christin Kocher Patient Status:  Inpatient    1923 MRN [id number]   Location [facility] 3W/SW Attending David Fajardo MD   Hosp Day # 6 PCP Hermann Banegas MD       Subjective:   Christin Kocher left hemidiaphragm. Dictated by (CST): Stewart Curry MD on 12/10/2018 at 16:54     Approved by (CST): Stewart Curry MD on 12/10/2018 at 16:56          Xr Chest Ap Portable  (cpt=71045)    Result Date: 12/10/2018  CONCLUSION:  1.  Continued improvement in the

## 2018-12-11 NOTE — CM/SW NOTE
CHRISTIANO informed by RN that pt is medically stable for discharge today at 3:30 pm. CHRISTIANO spoke with [facility] [phone] from [facility] who confirmed that they are able to accept the pt at that time into room E504 Bed 3. CHRISTIANO spoke with pt's Ramesh Cunningham

## 2018-12-13 NOTE — DISCHARGE SUMMARY
[facility]    PATIENT'S NAME: Rica Number   ATTENDING PHYSICIAN: Sherita Rogers MD   PATIENT ACCOUNT#:   [id number]    LOCATION:  [address] [place] [address] Jeanie Brittany RECORD #:   [id number]       YOB: 1923  ADMISSION DATE:       12/05/201 Flonase daily. ALLERGIES:  None. FAMILY HISTORY:  Unknown. SOCIAL HISTORY:  She does not smoke or drink. She lives at [facility]. REVIEW OF SYSTEMS:  Negative x9 systems.   The patient is a wheelchair-bound patient, able to t weaned off and diet is mechanical soft diet with nectar-thick liquid.      Dictated By Татьяна Lozada MD  d: 12/11/2018 07:20:00  t: 12/11/2018 07:32:23  [facility] [id number]  [facility]/

## 2019-01-01 ENCOUNTER — HOSPITAL ENCOUNTER (EMERGENCY)
Facility: HOSPITAL | Age: 84
End: 2019-01-01
Attending: EMERGENCY MEDICINE
Payer: MEDICARE

## 2019-01-01 ENCOUNTER — HOSPITAL ENCOUNTER (INPATIENT)
Facility: HOSPITAL | Age: 84
LOS: 2 days | Discharge: NURSING, REHAB OR LONG-TERM CARE | DRG: 684 | End: 2019-01-01
Attending: EMERGENCY MEDICINE | Admitting: FAMILY MEDICINE
Payer: MEDICARE

## 2019-01-01 ENCOUNTER — APPOINTMENT (OUTPATIENT)
Dept: GENERAL RADIOLOGY | Facility: HOSPITAL | Age: 84
DRG: 684 | End: 2019-01-01
Attending: EMERGENCY MEDICINE
Payer: MEDICARE

## 2019-01-01 ENCOUNTER — TELEPHONE (OUTPATIENT)
Dept: GASTROENTEROLOGY | Facility: CLINIC | Age: 84
End: 2019-01-01

## 2019-01-01 ENCOUNTER — APPOINTMENT (OUTPATIENT)
Dept: GENERAL RADIOLOGY | Facility: HOSPITAL | Age: 84
End: 2019-01-01
Payer: MEDICARE

## 2019-01-01 VITALS
DIASTOLIC BLOOD PRESSURE: 48 MMHG | HEART RATE: 90 BPM | TEMPERATURE: 98 F | BODY MASS INDEX: 20 KG/M2 | WEIGHT: 95.63 LBS | SYSTOLIC BLOOD PRESSURE: 129 MMHG | OXYGEN SATURATION: 98 % | RESPIRATION RATE: 18 BRPM

## 2019-01-01 DIAGNOSIS — N17.9 ACUTE RENAL FAILURE, UNSPECIFIED ACUTE RENAL FAILURE TYPE (HCC): ICD-10-CM

## 2019-01-01 DIAGNOSIS — E87.6 HYPOKALEMIA: ICD-10-CM

## 2019-01-01 DIAGNOSIS — N39.0 URINARY TRACT INFECTION WITHOUT HEMATURIA, SITE UNSPECIFIED: Primary | ICD-10-CM

## 2019-01-01 LAB
ANION GAP SERPL CALC-SCNC: 10 MMOL/L (ref 0–18)
ANION GAP SERPL CALC-SCNC: 15 MMOL/L (ref 0–18)
BASOPHILS # BLD: 0 K/UL (ref 0–0.2)
BASOPHILS NFR BLD: 0 %
BILIRUB UR QL: NEGATIVE
BUN SERPL-MCNC: 132 MG/DL (ref 8–20)
BUN SERPL-MCNC: 76 MG/DL (ref 8–20)
BUN/CREAT SERPL: 48.7 (ref 10–20)
BUN/CREAT SERPL: 52.8 (ref 10–20)
CALCIUM SERPL-MCNC: 8.3 MG/DL (ref 8.5–10.5)
CALCIUM SERPL-MCNC: 9.6 MG/DL (ref 8.5–10.5)
CHLORIDE SERPL-SCNC: 107 MMOL/L (ref 95–110)
CHLORIDE SERPL-SCNC: 119 MMOL/L (ref 95–110)
CO2 SERPL-SCNC: 18 MMOL/L (ref 22–32)
CO2 SERPL-SCNC: 21 MMOL/L (ref 22–32)
COLOR UR: YELLOW
CREAT SERPL-MCNC: 1.56 MG/DL (ref 0.5–1.5)
CREAT SERPL-MCNC: 2.5 MG/DL (ref 0.5–1.5)
EOSINOPHIL # BLD: 0 K/UL (ref 0–0.7)
EOSINOPHIL NFR BLD: 0 %
ERYTHROCYTE [DISTWIDTH] IN BLOOD BY AUTOMATED COUNT: 16 % (ref 11–15)
GLUCOSE SERPL-MCNC: 136 MG/DL (ref 70–99)
GLUCOSE SERPL-MCNC: 86 MG/DL (ref 70–99)
GLUCOSE UR-MCNC: NEGATIVE MG/DL
HCT VFR BLD AUTO: 43.4 % (ref 35–48)
HGB BLD-MCNC: 14.4 G/DL (ref 12–16)
KETONES UR-MCNC: NEGATIVE MG/DL
LYMPHOCYTES # BLD: 1.4 K/UL (ref 1–4)
LYMPHOCYTES NFR BLD: 17 %
MCH RBC QN AUTO: 29.5 PG (ref 27–32)
MCHC RBC AUTO-ENTMCNC: 33.1 G/DL (ref 32–37)
MCV RBC AUTO: 89.3 FL (ref 80–100)
METAMYELOCYTES # BLD MANUAL: 0.08 K/UL
MONOCYTES # BLD: 0.7 K/UL (ref 0–1)
MONOCYTES NFR BLD: 8 %
MRSA DNA SPEC QL NAA+PROBE: NEGATIVE
MYELOCYTES NFR BLD: 1 %
NEUTROPHILS # BLD AUTO: 6.1 K/UL (ref 1.8–7.7)
NEUTROPHILS NFR BLD: 63 %
NEUTS BAND NFR BLD: 11 %
NITRITE UR QL STRIP.AUTO: NEGATIVE
OSMOLALITY UR CALC.SUM OF ELEC: 326 MOSM/KG (ref 275–295)
OSMOLALITY UR CALC.SUM OF ELEC: 341 MOSM/KG (ref 275–295)
PH UR: 8 [PH] (ref 5–8)
PLATELET # BLD AUTO: 378 K/UL (ref 140–400)
PMV BLD AUTO: 9 FL (ref 7.4–10.3)
POTASSIUM SERPL-SCNC: 3 MMOL/L (ref 3.3–5.1)
POTASSIUM SERPL-SCNC: 4.4 MMOL/L (ref 3.3–5.1)
PROT UR-MCNC: 100 MG/DL
RBC # BLD AUTO: 4.86 M/UL (ref 3.7–5.4)
RBC #/AREA URNS AUTO: 107 /HPF
SODIUM SERPL-SCNC: 143 MMOL/L (ref 136–144)
SODIUM SERPL-SCNC: 147 MMOL/L (ref 136–144)
SP GR UR STRIP: 1.01 (ref 1–1.03)
UROBILINOGEN UR STRIP-ACNC: <2
VIT C UR-MCNC: NEGATIVE MG/DL
WBC # BLD AUTO: 8.3 K/UL (ref 4–11)
WBC #/AREA URNS AUTO: 189 /HPF

## 2019-01-01 PROCEDURE — 85025 COMPLETE CBC W/AUTO DIFF WBC: CPT | Performed by: EMERGENCY MEDICINE

## 2019-01-01 PROCEDURE — 85007 BL SMEAR W/DIFF WBC COUNT: CPT | Performed by: EMERGENCY MEDICINE

## 2019-01-01 PROCEDURE — 71045 X-RAY EXAM CHEST 1 VIEW: CPT | Performed by: EMERGENCY MEDICINE

## 2019-01-01 PROCEDURE — 80048 BASIC METABOLIC PNL TOTAL CA: CPT | Performed by: FAMILY MEDICINE

## 2019-01-01 PROCEDURE — 85027 COMPLETE CBC AUTOMATED: CPT | Performed by: EMERGENCY MEDICINE

## 2019-01-01 PROCEDURE — 92610 EVALUATE SWALLOWING FUNCTION: CPT

## 2019-01-01 PROCEDURE — 99285 EMERGENCY DEPT VISIT HI MDM: CPT

## 2019-01-01 PROCEDURE — 96374 THER/PROPH/DIAG INJ IV PUSH: CPT

## 2019-01-01 PROCEDURE — 80048 BASIC METABOLIC PNL TOTAL CA: CPT | Performed by: EMERGENCY MEDICINE

## 2019-01-01 PROCEDURE — 93005 ELECTROCARDIOGRAM TRACING: CPT

## 2019-01-01 PROCEDURE — 87086 URINE CULTURE/COLONY COUNT: CPT | Performed by: EMERGENCY MEDICINE

## 2019-01-01 PROCEDURE — 87641 MR-STAPH DNA AMP PROBE: CPT | Performed by: EMERGENCY MEDICINE

## 2019-01-01 PROCEDURE — 93010 ELECTROCARDIOGRAM REPORT: CPT | Performed by: EMERGENCY MEDICINE

## 2019-01-01 PROCEDURE — 81001 URINALYSIS AUTO W/SCOPE: CPT | Performed by: EMERGENCY MEDICINE

## 2019-01-01 RX ORDER — HYDROCHLOROTHIAZIDE 25 MG/1
12.5 TABLET ORAL DAILY
Status: DISCONTINUED | OUTPATIENT
Start: 2019-01-01 | End: 2019-01-01

## 2019-01-01 RX ORDER — MELATONIN
325
Status: DISCONTINUED | OUTPATIENT
Start: 2019-01-01 | End: 2019-01-01

## 2019-01-01 RX ORDER — SODIUM CHLORIDE 9 MG/ML
INJECTION, SOLUTION INTRAVENOUS CONTINUOUS
Status: DISCONTINUED | OUTPATIENT
Start: 2019-01-01 | End: 2019-01-01

## 2019-01-01 RX ORDER — FLUTICASONE PROPIONATE 50 MCG
1 SPRAY, SUSPENSION (ML) NASAL DAILY
Status: DISCONTINUED | OUTPATIENT
Start: 2019-01-01 | End: 2019-01-01

## 2019-01-01 RX ORDER — CLONAZEPAM 0.5 MG/1
0.5 TABLET ORAL 2 TIMES DAILY
Status: DISCONTINUED | OUTPATIENT
Start: 2019-01-01 | End: 2019-01-01

## 2019-01-01 RX ORDER — CARVEDILOL 3.12 MG/1
3.12 TABLET ORAL 2 TIMES DAILY WITH MEALS
Status: DISCONTINUED | OUTPATIENT
Start: 2019-01-01 | End: 2019-01-01

## 2019-01-01 RX ORDER — DONEPEZIL HYDROCHLORIDE 10 MG/1
10 TABLET, FILM COATED ORAL NIGHTLY
Status: DISCONTINUED | OUTPATIENT
Start: 2019-01-01 | End: 2019-01-01

## 2019-01-01 RX ORDER — HEPARIN SODIUM 5000 [USP'U]/ML
5000 INJECTION, SOLUTION INTRAVENOUS; SUBCUTANEOUS EVERY 12 HOURS SCHEDULED
Status: DISCONTINUED | OUTPATIENT
Start: 2019-01-01 | End: 2019-01-01

## 2019-01-01 RX ORDER — PRAVASTATIN SODIUM 20 MG
20 TABLET ORAL NIGHTLY
Status: DISCONTINUED | OUTPATIENT
Start: 2019-01-01 | End: 2019-01-01

## 2019-01-01 RX ORDER — CARVEDILOL 3.12 MG/1
3.12 TABLET ORAL 2 TIMES DAILY WITH MEALS
Qty: 1 TABLET | Refills: 1 | Status: SHIPPED | OUTPATIENT
Start: 2019-01-01

## 2019-01-01 RX ORDER — POTASSIUM CHLORIDE 14.9 MG/ML
20 INJECTION INTRAVENOUS ONCE
Status: COMPLETED | OUTPATIENT
Start: 2019-01-01 | End: 2019-01-01

## 2019-01-01 RX ORDER — POTASSIUM CHLORIDE 14.9 MG/ML
20 INJECTION INTRAVENOUS ONCE
Status: DISCONTINUED | OUTPATIENT
Start: 2019-01-01 | End: 2019-01-01

## 2019-01-01 RX ORDER — ACETAMINOPHEN 325 MG/1
650 TABLET ORAL EVERY 6 HOURS PRN
Status: DISCONTINUED | OUTPATIENT
Start: 2019-01-01 | End: 2019-01-01

## 2019-01-11 PROBLEM — N17.9 ACUTE RENAL FAILURE, UNSPECIFIED ACUTE RENAL FAILURE TYPE (HCC): Status: ACTIVE | Noted: 2019-01-01

## 2019-01-11 PROBLEM — N39.0 URINARY TRACT INFECTION WITHOUT HEMATURIA, SITE UNSPECIFIED: Status: ACTIVE | Noted: 2019-01-01

## 2019-01-11 PROBLEM — N39.0 URINARY TRACT INFECTION WITHOUT HEMATURIA: Status: ACTIVE | Noted: 2019-01-01

## 2019-01-11 NOTE — TELEPHONE ENCOUNTER
Patient was not seen by any of our GI providers   Future Appointments   Date Time Provider Riri Sifuentes   2/20/2019  1:00 PM Marcos Prado MD [facility]     appt was made regarding gtube.

## 2019-01-11 NOTE — TELEPHONE ENCOUNTER
Veronica Up requesting to speak with RN re: feeding tube that GS recommended -- CS is scheduled on 2/20/2019. Pls call. Thank you.

## 2019-01-12 PROBLEM — E86.0 DEHYDRATION: Status: ACTIVE | Noted: 2019-01-01

## 2019-01-12 NOTE — ED PROVIDER NOTES
Patient Seen in: [facility] Emergency Department    History   Patient presents with:  Fatigue (constitutional, neurologic)    Stated Complaint:     HPI    80-year-old female with history of advanced dementia leaving her nonverbal, COPD, hypertensi sacral decubitus changes with skin erythema. No open wound noted.   Musculoskeletal: neck is supple non tender        Extremities are symmetrical, full range of motion  Psychiatric: Patient is slightly agitated    ED Course     Labs Reviewed   BASIC METABO noted.  Will treat for urinary tract infection. Will admit for IV antibiotics and hydration. Discussed with Dr. Addison Cummins, covering for the patient's primary physician.   Admission disposition: 1/11/2019  9:44 PM                 Disposition and Plan     Clini

## 2019-01-12 NOTE — ED NOTES
Spoke with Ayse Bernabe RN at [facility] ([phone]) who states patient does not have active TB and gets tested twice a year.

## 2019-01-12 NOTE — ED NOTES
This RN spoke to Riri Coronado RN at [facility]. Per Riri Coronado RN, pt has had increased confusion and decreased appetite for about 1wk. Josefina Block states that pt has not eaten anything in about 1wk. Per Riri Coronado, IV infusion of D5 0.45% at 50ml/hr since 1300.  Tom Schilling

## 2019-01-12 NOTE — ED NOTES
Orders for admission, patient is aware of plan and ready to go upstairs.  Any questions, please call ED RN Myesha Polanco at extension [phone]

## 2019-01-12 NOTE — PLAN OF CARE
Problem: Patient Centered Care  Goal: Patient preferences are identified and integrated in the patient's plan of care  Interventions:  - What would you like us to know as we care for you?  Patient is non-verbal from [facility].  - Provide timely, comp Identify cognitive and physical deficits and behaviors that affect risk of falls.   - [place] fall precautions as indicated by assessment.  - Educate pt/family on patient safety including physical limitations  - Instruct pt to call for assistance with act appropriate  - Initiate Pressure Ulcer prevention bundle as indicated  Outcome: Progressing    Goal: Oral mucous membranes remain intact  INTERVENTIONS  - Assess oral mucosa and hygiene practices  - Implement preventative oral hygiene regimen  - Implement

## 2019-01-12 NOTE — PROGRESS NOTES
Paged Dr. Deborah Kingsley (oncall) with [facility] for new patient orders, medications, and diet at 02:24am.

## 2019-01-12 NOTE — ED INITIAL ASSESSMENT (HPI)
Care assumed from EMS. Per ems, pt has been having \"weakness\" and decreased appetite x1wk. Pt is aox1 which is baseline, per ems. Pt not responding to RN's questions.

## 2019-01-13 PROBLEM — E87.6 HYPOKALEMIA: Status: ACTIVE | Noted: 2019-01-01

## 2019-01-13 NOTE — CM/SW NOTE
Patient is from [facility], SW is waiting for a transfer time confirmation from [place]. ADDENDUM    Patient has been accepted back to [facility] on 1.13.19.  Ambulance transfer has been arranged due to patient being non ambulatory at 5 PM.

## 2019-01-13 NOTE — PLAN OF CARE
Problem: Patient Centered Care  Goal: Patient preferences are identified and integrated in the patient's plan of care  Interventions:  - What would you like us to know as we care for you?  Patient is non-verbal from [facility].  - Provide timely, comp Progressing      Problem: SAFETY ADULT - FALL  Goal: Free from fall injury  INTERVENTIONS:  - Assess pt frequently for physical needs  - Identify cognitive and physical deficits and behaviors that affect risk of falls.   - [place] fall precautions as tenzin without S/S of infection  INTERVENTIONS:  - Assess and document risk factors for pressure ulcer development  - Assess and document skin integrity  - Assess and document dressing/incision, wound bed, drain sites and surrounding tissue  - Implement wound car

## 2019-01-13 NOTE — PROGRESS NOTES
Report given to Yuri Thomas from [facility]. [facility]. IVs removed. Patient has paperwork and belongings.

## 2019-01-13 NOTE — PROGRESS NOTES
[facility]    Progress Note    Siobhancarolin Hutson Patient Status:  Inpatient    1923 MRN [id number]   Location [facility] 5SW/SE Attending Enrique Bishop MD   Hosp Day # 1 PCP Farheen White MD        Subjective:     Constit 12/07/2018    ALT 16 12/07/2018    PTT 29.6 12/06/2018    INR 1.3 (H) 12/06/2018    TSH 2.58 12/07/2018    B12 534 12/07/2018       Xr Chest Ap Portable  (cpt=71045)    Result Date: 1/11/2019  CONCLUSION:  1.  Small left pleural effusion with multifocal lef

## 2019-01-13 NOTE — PLAN OF CARE
Problem: Patient Centered Care  Goal: Patient preferences are identified and integrated in the patient's plan of care  Interventions:  - What would you like us to know as we care for you?  Patient is non-verbal from [facility].  - Provide timely, comp Identify cognitive and physical deficits and behaviors that affect risk of falls.   - [place] fall precautions as indicated by assessment.  - Educate pt/family on patient safety including physical limitations  - Instruct pt to call for assistance with act appropriate  - Initiate Pressure Ulcer prevention bundle as indicated  Outcome: Progressing    Goal: Oral mucous membranes remain intact  INTERVENTIONS  - Assess oral mucosa and hygiene practices  - Implement preventative oral hygiene regimen  - Implement

## 2019-01-13 NOTE — SLP NOTE
ADULT SWALLOWING EVALUATION    ASSESSMENT    ASSESSMENT/OVERALL IMPRESSION:  Orders received, chart reviewed. Patient known to SLP department with recent VFSS on 12/05/18. VFSS indicated laryngeal penetration on thin liquids via cup and NTL via cup.   NTL v HISTORY  Reason for Referral: Altered diet consistency    Problem List  Principal Problem:    Urinary tract infection without hematuria, site unspecified  Active Problems:    Urinary tract infection without hematuria    Acute renal failure, unspecified acu impaired  Laryngeal Elevation Coordination: Appears impaired  (Please note: Silent aspiration cannot be evaluated clinically.  Videofluoroscopic Swallow Study is required to rule-out silent aspiration.)    Esophageal Phase of Swallow: No complaints consiste

## 2019-01-13 NOTE — DISCHARGE SUMMARY
[facility]    Discharge Summary    Ed Cellar Patient Status:  Inpatient    1923 MRN [id number]   Location [facility] 5SW/SE Attending Harpreet Andre MD   Hosp Day # 2 PCP Aminah Saleh MD     Date of Admission:   (twelve) hours. Refills:  0     Lovastatin 20 MG Tabs      Take 20 mg by mouth nightly. Refills:  0     Megestrol Acetate 40 MG/ML Susp  Commonly known as:  MEGACE      Take by mouth daily.    Refills:  0     nystatin 324004 UNIT/GM Oint  Commonly known

## 2019-01-25 NOTE — ED NOTES
Awaiting arrival of son, Kelley Sheldon.  contacted and states okay to release the body to the morgue or  home.

## 2019-01-25 NOTE — ED NOTES
[facility] contacted and patient is not a candidate for donation due to age per Abisai San, ref # [id number].

## 2019-01-25 NOTE — ED INITIAL ASSESSMENT (HPI)
Pt arrives pulseless, s/p needle decompression by [facility]    Patient sent for pneumothorax from [facility]

## 2019-01-28 NOTE — ED PROVIDER NOTES
Patient Seen in: [facility] Emergency Department    History   Patient presents with:  Cardiac Arrest (cardiovascular)    Stated Complaint: collapsed lung with tracheal shift    HPI    Patient is a 44-year-old female who presents to the emergency d pressure.     Medications Prescribed:  Discharge Medication List as of 1/25/2019  2:17 PM

## 2019-06-06 NOTE — H&P
Gideon Chau 9837 Patient Status:  Inpatient    1923 MRN [id number]   Location [facility] 5SW/SE Attending No att. providers found   Hosp Day # 2 PCP Akira Patel MD       Past Medical Hist acetate 40 mg/mL once a day  Menthol zinc oxide external ointment  Multiple vitamins  Nystatin 10,000  Omeprazole 40 mg once a day  Potassium chloride 20 mEq once a day  Zinc 50 mg         Objective:   Blood pressure 119/48, pulse 99, temperature 97.7 °F ( WITHIN NORMAL LIMITS When compared with ECG of 12/05/2018 18:34:28 No significant changes have occurred Electronically signed on 01/12/2019 at 17:01 by MD Valencia Bautista MD  1/12/2019

## 2019-06-27 NOTE — H&P
[facility]    History and Physical    Patrick Alan Patient Status:  Inpatient    1923 MRN [id number]   Location [facility] 5SW/SE Attending No att. providers found   Hosp Day # 2 PCP Micki Galvan MD     Date:  2019 18, weight 95 lb 9.6 oz (43.4 kg), SpO2 98 %. Physical Exam  Cervical Papanicolaou done during hospital  Blood pressure 119/48, pulse 99, temperature 97.7 °F (36.5 °C), temperature source Axillary, resp. rate 16, weight 95 lb 9.6 oz (43.4 kg), SpO2 99 %.

## 2019-08-17 RX ORDER — AMLODIPINE BESYLATE 5 MG/1
5 TABLET ORAL DAILY
Status: ACTIVE | OUTPATIENT
Start: 2019-08-17

## 2019-08-17 RX ORDER — CLONAZEPAM 0.5 MG/1
0.25 TABLET ORAL 2 TIMES DAILY PRN
Status: ACTIVE | OUTPATIENT
Start: 2019-08-17

## (undated) DEVICE — TRAP MCS 40ML 5IN PLS SCR CAP

## (undated) DEVICE — STERILE LATEX POWDER-FREE SURGICAL GLOVESWITH NITRILE COATING: Brand: PROTEXIS

## (undated) DEVICE — ENDOSCOPY PACK UPPER: Brand: MEDLINE INDUSTRIES, INC.

## (undated) DEVICE — SYRINGE 10ML SLIP TIP

## (undated) DEVICE — CONTAINER CLIKSEAL PP 4OZ BLU

## (undated) DEVICE — SINGLE USE BIOPSY VALVE MAJ-210: Brand: SINGLE USE BIOPSY VALVE (STERILE)

## (undated) DEVICE — LINE MNTR ADLT SET O2 INTMD

## (undated) DEVICE — SINGLE USE SUCTION VALVE MAJ-209: Brand: SINGLE USE SUCTION VALVE (STERILE)

## (undated) DEVICE — 35 ML SYRINGE REGULAR TIP: Brand: MONOJECT

## (undated) NOTE — ED AVS SNAPSHOT
Siobhan Hutson   MRN: [id number]    Department:  [facility] Emergency Department   Date of Visit:  11/10/2018           Disclosure     Insurance plans vary and the physician(s) referred by the ER may not be covered by your plan.  Please contact y within the next three months to obtain basic health screening including reassessment of your blood pressure.     IF THERE IS ANY CHANGE OR WORSENING OF YOUR CONDITION, CALL YOUR PRIMARY CARE PHYSICIAN AT ONCE OR RETURN IMMEDIATELY TO THE EMERGENCY DEPARTMEN

## (undated) NOTE — IP AVS SNAPSHOT
Patient Demographics     Address  [facility] [place] [address] [address]  [facility] [address] Phone  [phone] (Home) *Preferred*      Emergency Contact(s)     Name Relation Home Work Mobile    HANNAH GRIER Brother [phone]      JABIER GRIER Relative Take 0.25 mg by mouth 2 (two) times daily as needed. Donepezil HCl 10 MG Tabs  Commonly known as:  ARICEPT  Next dose due: Tonight 12/11/18 at bedtime      Take 10 mg by mouth nightly.           ferrous sulfate 325 (65 FE) MG Tbec  Next dose d Next dose due: Tomorrow morning 12/12/18      Take 50 mg by mouth daily.                 Where to Get Your Medications      Please  your prescriptions at the location directed by your doctor or nurse    Bring a paper prescription for each of these m Order ID Medication Name Action Time Action Reason Comments    [id number] Fluticasone Propionate (FLONASE) 50 MCG/ACT nasal spray 1 spray 12/11/18 0931 Given            LEFT LOWER ABDOMEN     Order ID Medication Name Action Time Action Reason Comments    2 Order Status:  Sent Lab Status: In process Updated:  12/07/18 1448    Specimen: Body fluid, unspecified from Bronchial washing     Narrative: The following orders were created for panel order [employer].   Procedure Order Status:  Sent Lab Status: In process Updated:  12/07/18 0836    Specimen:   Body fluid, unspecified from Bronchial washing          H&P - H&P Note      H&P filed by Sonja Daniel MD at 12/6/2018 10:35 AM / Draft: Not Electronically Signed  Gretel Chaidez hydrochlorothiazide 20 mg q.a.m., omeprazole 40 mg daily, and Flonase daily. ALLERGIES:  None. FAMILY HISTORY:  Unknown. SOCIAL HISTORY:  She does not smoke or drink. She lives at [address] [place] [address].      REVIEW OF SYSTEMS:  Negative Consults signed by Helen Will MD at 12/9/2018 10:33 AM      Author:  Helen Will MD Service:  Pulmonology Author Type:  Physician    Filed:  12/9/2018 10:33 AM Status:  Signed    :  Helen Will MD (Physician)       [facility] normal.  External ears: No deformity. Ear canals were patent. Eardrums were intact. Nose:  No congestion or polyp. Mouth and throat free. NECK:  No neck stiffness. No palpable nodes. No carotid bruit. CHEST:  Symmetrical.  LUNGS:  Clear.   No rales, FINDINGS:   CARDIAC/MEDIASTINUM:   The cardiac silhouette is enlarged. There is atherosclerotic calcification of the aortic arch and thoracic aorta.   LUNGS:    There is diffuse perihilar fullness and bilateral reticulonodular opacities and perihilar Ross Curio ------------------------------------------------------------------- Study Conclusions 1. Left ventricle: The cavity size was normal. Wall thickness was    increased in a pattern of mild LVH.  Systolic function was    normal. Wall motion was normal; there we date:  Study date: 12/06/2018. Study time: 03:21 PM.  Location: Echo laboratory. ------------------------------------------------------------------- Findings Left ventricle:   The cavity size was normal. Wall thickness was increased in a pattern of mild LVH artery:   The right ventricular systolic pressure was increased consistent with moderate pulmonary hypertension. Right atrium:  The atrium was normal in size. Pericardium:  There was no pericardial effusion. Quality of study:  Image quality was adequate.  Kimberly Smiley leaflet separation, MM             0.7   cm       ---------  Aortic valve peak velocity, S             2.97  m/sec    ---------  Aortic valve VTI, S                       62.8  cm       ---------  Aortic mean gradient, S                   19    mm Hg    -- Reference  Estimated CVP                             8     mm Hg    ---------   Right ventricle                           Value          Reference  RV pressure, S, DP                        56    mm Hg    --------- Legend: (L)  and  (H)  yuan values outsid The patient's[MD.1] Approx Degree of Impairment: 100%[MD.2] has been calculated based on documentation in the [facility] '6 clicks' Inpatient Basic Mobility Short Form.   Research supports that patients with this level of impairment may benefit from return to [facility] Standardized Score (AM-PAC Scale): 23.55   CMS Modifier (G-Code): CN[MD.2]    FUNCTIONAL ABILITY STATUS  Gait Assessment[MD.1]   Gait Assistance: Not tested           Stoop/Curb Assistance: Not tested      Patient End of Session: In bed;Needs met;Call [facility] info regarding pt's previous mobility status unknown. Pt will benefit from trial of PT services while in house 3x/week.  Pt's current impairments include weakness, impaired cognition, altered posture, and decreased endurance which are limiting her overall f · Poor at following commands    RANGE OF MOTION AND STRENGTH ASSESSMENT      Lower extremity ROM is within functional limits   Lower extremity strength grossly 3/5    BALANCE  Static Sitting: Fair -     Static Standing: Poor -              ACTIVITY TOLERAN Current Status    Goal #4    Goal #4   Current Status        Goal #5   Current Status    Goal #6    Goal #6  Current Status[AL.1]         [place]    AL. 1 Ondina Fischer, PT on 12/8/2018  4:57 PM                        Occupational Therapy Notes ( Form.  Research supports that patients with this level of impairment may benefit from long term care.     DISCHARGE RECOMMENDATIONS  OT Discharge Recommendations: Long term care  OT Device Recommendations: None  PLAN  Patient has been evaluated and presents -   Bathing (including washing, rinsing, drying)?: Total  -   Toileting, which includes using toilet, bedpan or urinal? : Total  -   Putting on and taking off regular upper body clothing?: Total  -   Taking care of personal grooming such as brushing teeth? strategies with RN at bedside. RN verbalized understanding. RN reports pt tolerating diet and medication administration well with no overt CSA.  RN reports pt with impulsivity; however, 1:1 feeding assist is provided at each meal.  Pt tolerated medication c Pt tolerates medication crushed in pureed, mechanical soft solids, and nectar thickened liquids via TSP with 1:1 feeding assist with no overt clinical signs of aspiration (e.g., immediate/delayed throat clear, immediate/delayed cough, wet vocal quality, in

## (undated) NOTE — IP AVS SNAPSHOT
[facility]            (For Outpatient Use Only) Initial Admit Date: 12/5/2018   Inpt/Obs Admit Date: Inpt: 12/5/18 / Obs: N/A   Discharge Date:    Dominique Miles:  [de-identified]   MRN: [de-identified]   CSN: [id number]        ENCOUNTER  Patient Class Subscriber Name:  Santana Daigle :    Subscriber ID:  Pt Rel to Subscriber:    Hospital Account Financial Class: Medicare    2018

## (undated) NOTE — IP AVS SNAPSHOT
[facility]            (For Outpatient Use Only) Initial Admit Date: 1/11/2019   Inpt/Obs Admit Date: Inpt: 1/11/19 / Obs: N/A   Discharge Date:    Lino Novak:  [de-identified]   MRN: [de-identified]   CSN: [id number]        ENCOUNTER  Patient Class Group Number:  Insurance Type:    Subscriber Name:  Subscriber :    Subscriber ID:  Pt Rel to Subscriber:    Hospital Account Financial Class: Medicare    2019

## (undated) NOTE — IP AVS SNAPSHOT
Patient Demographics     Address  [address] [place] [address] [address]  [place] [address] Phone  [phone] (Home) *Preferred*      Emergency Contact(s)     Name Relation Home Work Mobile    HANNAH GRIER Brother [phone]      JABIER GRIER Relative Inhale 1 puff into the lungs every 12 (twelve) hours. Lovastatin 20 MG Tabs  Next dose due:  1/13/19 at 9pm      Take 20 mg by mouth nightly.           Megestrol Acetate 40 MG/ML Susp  Commonly known as:  MEGACE  Next dose due:  1/14/19 at 9am Order ID Medication Name Action Time Action Reason Comments    [id number] Heparin Sodium (Porcine) 5000 UNIT/ML injection 5,000 Units 01/12/19 4334 Given      [id number] Heparin Sodium (Porcine) 5000 UNIT/ML injection 5,000 Units 01/13/19 0911 Given Procedure Component Value Units Date/Time    Urine Culture, Routine Once [[id number]] Collected:  01/11/19 2027    Order Status:  Completed Lab Status:  Final result Updated:  01/12/19 2018    Specimen:  Urine, clean catch      Urine Culture >100,000 cfu/m Commonly known as:  VENTOLIN      Take 2.5 mg by nebulization every 4 (four) hours as needed for Wheezing. Refills:  0     AmLODIPine Besylate 5 MG Tabs  Commonly known as:  NORVASC      Take 1 tablet (5 mg total) by mouth daily.    Quantity:  30 tablet Please  your prescriptions at the location directed by your doctor or nurse    Bring a paper prescription for each of these medications  · carvedilol 3.125 MG Tabs[PG.2]         Follow up Visits: Follow-up with Dr Yvrose Wall in 3 days.     [address] in bed leaning forward for BSE. Pt alert for PO trials. SLP assisted with feeding of nectar thick liquid via tsp amounts and mechanical soft consistency trials. Pt with intact labial seal with no anterior loss of NTL bolus.  Lingual skills adequate for Diagnosis Date   • Anxiety    • COPD (chronic obstructive pulmonary disease) ([facility] [address]    • Dementia    • Essential hypertension    • Hyperlipidemia    • Renal disorder      Prior Living Situation: Assisted living;Unknown  Diet Prior to Admission: Mechanical s GOALSGoal #1 The patient will tolerate mechanical soft consistency and nectar thick liquids without overt signs or symptoms of aspiration with 100 % accuracy over 2 session(s).   In Progress   Goal #2 The patient will utilize compensatory strategies as outl